# Patient Record
(demographics unavailable — no encounter records)

---

## 2024-11-04 NOTE — HISTORY OF PRESENT ILLNESS
[FreeTextEntry1] : 39 yo  @ 6w1d by LMP of  presents for management of unwanted pregnancy - pt denies bleeding/CTX  reports regular monthly cycle  PMH: rheumatoid arthritis Meds: Oluminate Prednisone All: NKA GYN: denies fibroids/cysts/STI/abn pap OB: 2020 FT c/s due to NRFHT 7lbs, female PSH: c/s x 1, lap [place] Social: denies toxic habits Psych: h/o anxiety/depression  Todays Sono intrauterine GS c/w5w1d- no FP/YS adnexa WNL

## 2024-11-04 NOTE — PLAN
[FreeTextEntry1] : 39 yo  with unwanted pregnancy - currently early IUP vs pregnancy of unknown location - f/u HCG today and in 48-72h - discussed options surgical vs medical options- pt opts for med ab - f/u CMP, T&S and CBC  f/u in 1 week for repeat sonogram  precautions given

## 2024-11-12 NOTE — PLAN
[FreeTextEntry1] : 1- unwanted pregnancy -todays sono with GS no YS/FP - f/u in 1-2 weeks for repeat sonogram and medical   2- vaginal discharge - f/u vaginitis swab - rx given for miconazole and lotrisone  f/u in 1-2 weeks and prn  ectopic precautions given

## 2024-11-12 NOTE — HISTORY OF PRESENT ILLNESS
[FreeTextEntry1] : 39 yo  LMP  presents for f/u sonogram for unwanted pregnancy. Denies pelvic pain or vaginal bleeding Reports foul smelling thick white vaginal discharge with itching x 2-3days  today's sono: Intrauterine GS c/w 5w3d no YS/FP Adnexa WNL subchorionic hematoma 0.9x0.6x2.1.18cm

## 2024-12-06 NOTE — PLAN
[FreeTextEntry1] : 37 yo P1 with complete  after medical management - f.u serum HCG in1 week      Contraception Counseling: D/w pt IUD, nexplanon, COCPs R/B/A Rx given for lo loestrin x 3 mos to start after HCG neg D/w pt how to take OCP, and also R/B including but not limited to irregular bleeding, changes in mood, weight changes, VTE, MI, Stroke, GB and liver disease. Advised pt to avoid smoking. Pt is a non smoker. D/w pt FHx of no blood clot disorders  will schedule preop appt with Dr Nix for bilateral salpingectomy  f/u prn and for annual

## 2024-12-06 NOTE — HISTORY OF PRESENT ILLNESS
[FreeTextEntry1] : 39 yo female s/p medical termination for unwanted pregnancy and blighted ovum presents for f/u. no bleeding or pain at this time  UCG - faintly positive  Pt interested in bilateral salpingectomy at this time  todays sono  EE 7.82 No RPOC seen  ovaries WNL

## 2025-03-06 NOTE — PLAN
[FreeTextEntry1] : Pre-op for b/l Salpingectomy: Surgery described in detail and to be performed in main operating room Discussed risks of procedure to include, but not limited to: - blood loss and possible need for transfusion - infection - possible injury to viscus or blood vessel that could even require immediate surgical intervention for correction - risk of needing open vertical incision discussed - potential malignancy and need for staging operation - gyn oncologist on standby No heavy lifting, strenuous exercise or tub bathing for 6 weeks Nothing per vagina for 6 weeks  Pt to return to work in 2-3 weeks, laparoscopic operation  Plan is for Operative b/l Salpingectomy possible XLAP, CHARLY, BSO staging   25 min > 50% consultation

## 2025-03-06 NOTE — PHYSICAL EXAM
[Chaperone Present] : A chaperone was present in the examining room during all aspects of the physical examination [FreeTextEntry2] :  Onel gatica)

## 2025-03-06 NOTE — HISTORY OF PRESENT ILLNESS
[FreeTextEntry1] : 39 year old JUSTICE MARCELO pt presents for pre-op b/l salpingo-oophorectomy.   She reports not wishing to conceive any more children, desires permanent sterilization. She had an IUD which was complicated by IUD migration, requiring removal vaginally. She reports normal recovery s/p cholecystectomy. She is s/p TOP for unwanted pregnancy 11/24. She reports this was a difficult time.   Denies abdominal or pelvic pain.   PMH: rheumatoid arthritis SHx: laparoscopic Cholecystectomy in 2024, C/S in 2021, liposuction Meds: olumiant (baricitinib)  FHx: Mom's cousin with breast ca. Denies Fhx of uterine or ovarian ca.

## 2025-07-10 NOTE — REASON FOR VISIT
[FreeTextEntry1] : a 39-year-old female presents to the emergency room with palpitations shortness of breath and headache.  Past medical history of RA.  Reports she is noted intermittent palpitations with associated shortness of breath and frontal headache.  No provoking or relieving symptoms.  Denies any fever nausea vomiting chest pain or abdominal pain.  No recent travel long car rides no calf pain or swelling Two days prior started when going to bed and felt like her heart was racing and shorntess of breath with dizziness and lighthheadedness ; would eventually come and go throughtout [place] sympotms still ther ebut s July2 nd feel like shes doing nothign and taking deep breaths lasting for a few seconds  Did have chest tightness across   No recnet cough fevers or chills  Enlarged thyroid, Dr. latonia Dupree pressure runs in her family   Smoker: social smoker  Family Hx: Hypertenion, mom - PH bradycardia afib PPM  MGM  CHF  PGF - Heart attack  M GF hx o stroke